# Patient Record
Sex: FEMALE | URBAN - METROPOLITAN AREA
[De-identification: names, ages, dates, MRNs, and addresses within clinical notes are randomized per-mention and may not be internally consistent; named-entity substitution may affect disease eponyms.]

---

## 2023-03-03 ENCOUNTER — OFFICE VISIT (OUTPATIENT)
Dept: URBAN - METROPOLITAN AREA CLINIC 109 | Facility: CLINIC | Age: 33
End: 2023-03-03
Payer: COMMERCIAL

## 2023-03-03 VITALS — TEMPERATURE: 98.1 F

## 2023-03-03 DIAGNOSIS — K59.00 CONSTIPATION, UNSPECIFIED CONSTIPATION TYPE: ICD-10-CM

## 2023-03-03 DIAGNOSIS — R19.7 DIARRHEA, UNSPECIFIED TYPE: ICD-10-CM

## 2023-03-03 DIAGNOSIS — R10.13 EPIGASTRIC ABDOMINAL PAIN: ICD-10-CM

## 2023-03-03 DIAGNOSIS — K58.2 IRRITABLE BOWEL SYNDROME WITH BOTH CONSTIPATION AND DIARRHEA: ICD-10-CM

## 2023-03-03 PROBLEM — 10743008: Status: ACTIVE | Noted: 2023-03-03

## 2023-03-03 PROBLEM — 14760008: Status: ACTIVE | Noted: 2023-03-03

## 2023-03-03 PROCEDURE — 99203 OFFICE O/P NEW LOW 30 MIN: CPT | Performed by: INTERNAL MEDICINE

## 2023-03-03 NOTE — HPI-TODAY'S VISIT:
currently 3 months pregnant so not sure what meds can take reports hx IBS, would take Alovera Gel to help with the severe constipation, will have constipation that will turn into diarrhea, will stop the aloe vera and then the symptoms return of hard constipaiton but will also have normal days  also for the last few months having epi abd pain, just taking butter milk to help, helping a little

## 2023-10-13 ENCOUNTER — DASHBOARD ENCOUNTERS (OUTPATIENT)
Age: 33
End: 2023-10-13

## 2023-10-13 ENCOUNTER — OFFICE VISIT (OUTPATIENT)
Dept: URBAN - METROPOLITAN AREA CLINIC 109 | Facility: CLINIC | Age: 33
End: 2023-10-13
Payer: COMMERCIAL

## 2023-10-13 VITALS
TEMPERATURE: 97.7 F | WEIGHT: 140.2 LBS | HEIGHT: 64 IN | BODY MASS INDEX: 23.93 KG/M2 | HEART RATE: 84 BPM | SYSTOLIC BLOOD PRESSURE: 79 MMHG | DIASTOLIC BLOOD PRESSURE: 52 MMHG

## 2023-10-13 DIAGNOSIS — K58.2 IRRITABLE BOWEL SYNDROME WITH BOTH CONSTIPATION AND DIARRHEA: ICD-10-CM

## 2023-10-13 DIAGNOSIS — R10.13 EPIGASTRIC ABDOMINAL PAIN: ICD-10-CM

## 2023-10-13 PROCEDURE — 99213 OFFICE O/P EST LOW 20 MIN: CPT | Performed by: INTERNAL MEDICINE

## 2023-10-13 NOTE — HPI-TODAY'S VISIT:
had her son, Dewayne, on 8/31/23 had Csx she reports anemic supposed to take iron and she can't take them because causing severe GI upset, but was given IV iron  also with sig constipation, she was taking the metamucil and it helped a lot lately doing much better and only taking a couple times a month  she reports however told to go back on the iron but reports nuts/dried fruits making her constipated  Of 3/2023 currently 3 months pregnant so not sure what meds can take reports hx IBS, would take Alovera Gel to help with the severe constipation, will have constipation that will turn into diarrhea, will stop the aloe vera and then the symptoms return of hard constipaiton but will also have normal days  also for the last few months having epi abd pain, just taking butter milk to help, helping a little